# Patient Record
Sex: MALE | Race: WHITE
[De-identification: names, ages, dates, MRNs, and addresses within clinical notes are randomized per-mention and may not be internally consistent; named-entity substitution may affect disease eponyms.]

---

## 2018-10-30 ENCOUNTER — HOSPITAL ENCOUNTER (OUTPATIENT)
Dept: HOSPITAL 89 - RAD | Age: 43
End: 2018-10-30
Attending: FAMILY MEDICINE
Payer: OTHER GOVERNMENT

## 2018-10-30 DIAGNOSIS — M19.011: Primary | ICD-10-CM

## 2018-10-30 NOTE — RADIOLOGY IMAGING REPORT
FACILITY: [facility] [place] 

 

PATIENT NAME: Ty Marcus

: 1975

MR: [id number]

V: [id number]

EXAM DATE: 

ORDERING PHYSICIAN: SLY MONREAL

TECHNOLOGIST: 

 

Location: [facility]

Patient: Ty Marcus

: 1975

MRN: [id number]

Visit/Account:[id number]

Date of Sevice: 10/30/2018

 

ACCESSION #: [id number]

 

Exam type: SHOULDER MIN 2 VIEWS RIGHT

 

History: Right shoulder pain, injured doing pushups two weeks ago

 

Comparison: None.

 

Findings:

 

Two views of the right shoulder demonstrate no evidence of acute fracture or dislocation.  Incidental
ly noted are mild degenerative changes at the right AC joint

 

IMPRESSION:

 

1.  Mild degenerative changes of the right AC joint although no evidence of acute fracture-dislocatio
n of the right shoulder

 

Report Dictated By: Jia Carmona MD at 10/30/2018 11:51 AM

 

Report E-Signed By: Jia Carmona MD  at 10/30/2018 11:52 AM

 

[id number]

## 2018-11-19 ENCOUNTER — HOSPITAL ENCOUNTER (OUTPATIENT)
Dept: HOSPITAL 89 - MRI | Age: 43
End: 2018-11-19
Attending: FAMILY MEDICINE
Payer: OTHER GOVERNMENT

## 2018-11-19 DIAGNOSIS — M75.21: Primary | ICD-10-CM

## 2018-11-19 NOTE — RADIOLOGY IMAGING REPORT
FACILITY: [facility] 

 

PATIENT NAME: Ty Marcus

: 1975

MR: [id number]

V: [id number]

EXAM DATE: 

ORDERING PHYSICIAN: SLY MONREAL

TECHNOLOGIST: 

 

Location: [facility] [place]

Patient: Ty Marcus

: 1975

MRN: [id number]

Visit/Account:[id number]

Date of Sevice: 2018

 

ACCESSION #: [id number]

 

SHOULDER RIGHT W/O CONTRAST

 

HISTORY: Shoulder pain

 

COMPARISON: X-ray 10/30/2018

 

TECHNIQUE: Multiplanar/multisequence was obtained through the right shoulder without contrast.

 

Contrast: None

 

FINDINGS:

 

Rotator cuff: Mild tendinosis of the supraspinatus tendon with mild articular surface partial tearing
 of its anterior fibers. Infraspinatus tendon is normal. Teres minor tendon is normal. Subscapularis 
tendon is normal. No full-thickness tear of the rotator cuff. No tendinous retraction. Mild fatty str
eaking of the teres minor muscle.

Subacromial/subdeltoid fluid: Small amount

Joint effusion: None significant

Biceps tendon: Resides within the bicipital groove and is intact to the labrum without signal abnorma
lity.

Glenohumeral joint and labrum: There is motion on coronal images. Probable nondisplaced tearing of th
e superior labrum. Questionable tiny paralabral cyst at the superior-posterior corner. Articular cart
ilage is intact.

AC joint : Moderate proliferative changes with moderate bone marrow edema within the distal clavicle.
 Small inferior bony spurs. No significant lateral acromial downsloping. Acromium is type II.

Bone marrow: Normal

Soft tissues: Normal

Other findings: None significant

 

IMPRESSION:

 

1. Mild tendinosis with mild partial-thickness articular surface tearing of the anterior fibers of th
e supraspinatus tendon at its insertion. No full-thickness tear of the rotator cuff.

2. Mild fatty streaking of the teres minor muscle which can be seen with denervation or quadrilateral
 space syndrome.

3. Probable nondisplaced tearing of the superior labrum with questionable tiny paralabral cyst at the
 superior-posterior corner.

4. Moderate proliferative changes with moderate bone marrow edema at the distal clavicle. Recommend c
orrelation for pain at this site.

 

Report Dictated By: Dylan Marshall MD at 2018 11:32 AM

 

Report E-Signed By: Dylan Marshall MD  at 2018 11:46 AM

 

WSN:[id number]